# Patient Record
Sex: MALE | Race: WHITE | NOT HISPANIC OR LATINO | Employment: OTHER | URBAN - METROPOLITAN AREA
[De-identification: names, ages, dates, MRNs, and addresses within clinical notes are randomized per-mention and may not be internally consistent; named-entity substitution may affect disease eponyms.]

---

## 2017-02-20 ENCOUNTER — TELEPHONE (OUTPATIENT)
Dept: GASTROENTEROLOGY | Facility: CLINIC | Age: 72
End: 2017-02-20

## 2020-02-19 PROBLEM — I10 BENIGN ESSENTIAL HYPERTENSION: Status: ACTIVE | Noted: 2020-02-19

## 2020-02-19 PROBLEM — I63.9 CEREBROVASCULAR ACCIDENT (CVA): Status: ACTIVE | Noted: 2020-02-19

## 2020-02-19 PROBLEM — E11.9 TYPE 2 DIABETES MELLITUS, WITHOUT LONG-TERM CURRENT USE OF INSULIN: Status: ACTIVE | Noted: 2020-02-19

## 2020-02-19 PROBLEM — E78.5 HYPERLIPIDEMIA: Status: ACTIVE | Noted: 2020-02-19

## 2020-02-20 PROBLEM — I63.239 CAROTID STENOSIS, SYMPTOMATIC, WITH INFARCTION: Status: ACTIVE | Noted: 2020-02-20

## 2020-02-24 ENCOUNTER — TELEPHONE (OUTPATIENT)
Dept: VASCULAR SURGERY | Facility: CLINIC | Age: 75
End: 2020-02-24

## 2020-02-24 NOTE — TELEPHONE ENCOUNTER
----- Message from Eduarda Calvert sent at 2/24/2020  9:32 AM CST -----  Contact: patient's wife Lorin  Type: Needs Medical Advice    Who Called:  Lorin    Robert Call Back Number: [phone]  Additional Information: patient was discharged from [facility] on 02/22/2020 and was told to follow up with Dr. Mckeon 2 weeks later.  Please call to schedule this appt.

## 2020-03-10 ENCOUNTER — OFFICE VISIT (OUTPATIENT)
Dept: VASCULAR SURGERY | Facility: CLINIC | Age: 75
End: 2020-03-10
Payer: MEDICARE

## 2020-03-10 VITALS
SYSTOLIC BLOOD PRESSURE: 175 MMHG | HEIGHT: 71 IN | BODY MASS INDEX: 37.69 KG/M2 | HEART RATE: 64 BPM | DIASTOLIC BLOOD PRESSURE: 75 MMHG | WEIGHT: 269.19 LBS

## 2020-03-10 DIAGNOSIS — I63.239 CAROTID STENOSIS, SYMPTOMATIC, WITH INFARCTION: ICD-10-CM

## 2020-03-10 DIAGNOSIS — I65.21 CAROTID STENOSIS, RIGHT: Primary | ICD-10-CM

## 2020-03-10 PROCEDURE — 99999 PR PBB SHADOW E&M-EST. PATIENT-LVL III: ICD-10-PCS | Mod: PBBFAC,,, | Performed by: THORACIC SURGERY (CARDIOTHORACIC VASCULAR SURGERY)

## 2020-03-10 PROCEDURE — 99999 PR PBB SHADOW E&M-EST. PATIENT-LVL III: CPT | Mod: PBBFAC,,, | Performed by: THORACIC SURGERY (CARDIOTHORACIC VASCULAR SURGERY)

## 2020-03-10 PROCEDURE — 99024 POSTOP FOLLOW-UP VISIT: CPT | Mod: S$GLB,,, | Performed by: THORACIC SURGERY (CARDIOTHORACIC VASCULAR SURGERY)

## 2020-03-10 PROCEDURE — 99024 PR POST-OP FOLLOW-UP VISIT: ICD-10-PCS | Mod: S$GLB,,, | Performed by: THORACIC SURGERY (CARDIOTHORACIC VASCULAR SURGERY)

## 2020-03-11 NOTE — PROGRESS NOTES
"Subjective:       Jacob Noe presents to the clinic after undergoing right carotid endarterectomy with bovine pericardial patch angioplasty on 02/21/2020 at [facility] and treatment of critical right internal carotid artery stenosis associated with right hemispheric cerebrovascular accident.  He still has some slight forgetfulness, but he and his wife both feel that this is improving.  He also notices that the weakness in his left arm is nearly resolved and the vision in his right eye is markedly improved.       Objective:      BP (!) 175/75 (BP Location: Right arm, Patient Position: Sitting, BP Method: Large (Automatic))   Pulse 64   Ht 5' 11" (1.803 m)   Wt 122.1 kg (269 lb 2.9 oz)   BMI 37.54 kg/m²     Objective  General:  He appears well.  HEENT:  Good facial symmetry.  Tongue protrudes in midline.  Neck:  Right neck incision is healed.  There is no carotid bruit on either side.  Neurologic:  Alert oriented x4.  There is no obvious upper extremity weakness.  His speech is clear.  He did put down is eyeglasses just prior to walking out of the room and left them on the desk in the room (I hand them to his wife).       Assessment:      Doing well postoperatively.      Plan:      1. Continue any current medications.  2. Wound care discussed.  3. Pt is to increase activities as tolerated.  4. Follow up: 1 year with carotid ultrasound    "

## 2021-04-01 ENCOUNTER — HOSPITAL ENCOUNTER (OUTPATIENT)
Dept: RADIOLOGY | Facility: HOSPITAL | Age: 76
Discharge: HOME | End: 2021-04-01
Attending: THORACIC SURGERY (CARDIOTHORACIC VASCULAR SURGERY)
Payer: MEDICARE

## 2021-04-01 DIAGNOSIS — I65.21 CAROTID STENOSIS, RIGHT: ICD-10-CM

## 2021-04-01 PROCEDURE — 93880 EXTRACRANIAL BILAT STUDY: CPT | Mod: TC,PO

## 2021-04-01 PROCEDURE — 93880 EXTRACRANIAL BILAT STUDY: CPT | Mod: 26,,, | Performed by: RADIOLOGY

## 2021-04-01 PROCEDURE — 93880 US CAROTID BILATERAL: ICD-10-PCS | Mod: 26,,, | Performed by: RADIOLOGY

## 2021-06-04 ENCOUNTER — TELEPHONE (OUTPATIENT)
Dept: VASCULAR SURGERY | Facility: CLINIC | Age: 76
End: 2021-06-04

## 2021-07-01 ENCOUNTER — PATIENT MESSAGE (OUTPATIENT)
Dept: ADMINISTRATIVE | Facility: OTHER | Age: 76
End: 2021-07-01

## 2022-01-10 ENCOUNTER — OFFICE VISIT (OUTPATIENT)
Dept: CARDIOLOGY | Facility: CLINIC | Age: 77
End: 2022-01-10
Payer: MEDICARE

## 2022-01-10 VITALS
HEIGHT: 71 IN | OXYGEN SATURATION: 99 % | BODY MASS INDEX: 40.92 KG/M2 | SYSTOLIC BLOOD PRESSURE: 152 MMHG | HEART RATE: 74 BPM | WEIGHT: 292.31 LBS | DIASTOLIC BLOOD PRESSURE: 64 MMHG

## 2022-01-10 DIAGNOSIS — I63.239 CAROTID STENOSIS, SYMPTOMATIC, WITH INFARCTION: ICD-10-CM

## 2022-01-10 DIAGNOSIS — I10 ESSENTIAL HYPERTENSION: Primary | ICD-10-CM

## 2022-01-10 DIAGNOSIS — I63.40 CEREBROVASCULAR ACCIDENT (CVA) DUE TO EMBOLISM OF CEREBRAL ARTERY: ICD-10-CM

## 2022-01-10 DIAGNOSIS — R09.89 BRUIT: ICD-10-CM

## 2022-01-10 DIAGNOSIS — E78.2 MIXED HYPERLIPIDEMIA: ICD-10-CM

## 2022-01-10 DIAGNOSIS — Z98.890 HISTORY OF RIGHT-SIDED CAROTID ENDARTERECTOMY: ICD-10-CM

## 2022-01-10 PROCEDURE — 99999 PR PBB SHADOW E&M-EST. PATIENT-LVL III: ICD-10-PCS | Mod: PBBFAC,,, | Performed by: INTERNAL MEDICINE

## 2022-01-10 PROCEDURE — 1126F AMNT PAIN NOTED NONE PRSNT: CPT | Mod: CPTII,S$GLB,, | Performed by: INTERNAL MEDICINE

## 2022-01-10 PROCEDURE — 3288F PR FALLS RISK ASSESSMENT DOCUMENTED: ICD-10-PCS | Mod: CPTII,S$GLB,, | Performed by: INTERNAL MEDICINE

## 2022-01-10 PROCEDURE — 99214 PR OFFICE/OUTPT VISIT, EST, LEVL IV, 30-39 MIN: ICD-10-PCS | Mod: S$GLB,,, | Performed by: INTERNAL MEDICINE

## 2022-01-10 PROCEDURE — 1160F RVW MEDS BY RX/DR IN RCRD: CPT | Mod: CPTII,S$GLB,, | Performed by: INTERNAL MEDICINE

## 2022-01-10 PROCEDURE — 1101F PT FALLS ASSESS-DOCD LE1/YR: CPT | Mod: CPTII,S$GLB,, | Performed by: INTERNAL MEDICINE

## 2022-01-10 PROCEDURE — 1126F PR PAIN SEVERITY QUANTIFIED, NO PAIN PRESENT: ICD-10-PCS | Mod: CPTII,S$GLB,, | Performed by: INTERNAL MEDICINE

## 2022-01-10 PROCEDURE — 3078F DIAST BP <80 MM HG: CPT | Mod: CPTII,S$GLB,, | Performed by: INTERNAL MEDICINE

## 2022-01-10 PROCEDURE — 3078F PR MOST RECENT DIASTOLIC BLOOD PRESSURE < 80 MM HG: ICD-10-PCS | Mod: CPTII,S$GLB,, | Performed by: INTERNAL MEDICINE

## 2022-01-10 PROCEDURE — 1159F MED LIST DOCD IN RCRD: CPT | Mod: CPTII,S$GLB,, | Performed by: INTERNAL MEDICINE

## 2022-01-10 PROCEDURE — 1159F PR MEDICATION LIST DOCUMENTED IN MEDICAL RECORD: ICD-10-PCS | Mod: CPTII,S$GLB,, | Performed by: INTERNAL MEDICINE

## 2022-01-10 PROCEDURE — 93010 ELECTROCARDIOGRAM REPORT: CPT | Mod: S$GLB,,, | Performed by: INTERNAL MEDICINE

## 2022-01-10 PROCEDURE — 3008F BODY MASS INDEX DOCD: CPT | Mod: CPTII,S$GLB,, | Performed by: INTERNAL MEDICINE

## 2022-01-10 PROCEDURE — 1160F PR REVIEW ALL MEDS BY PRESCRIBER/CLIN PHARMACIST DOCUMENTED: ICD-10-PCS | Mod: CPTII,S$GLB,, | Performed by: INTERNAL MEDICINE

## 2022-01-10 PROCEDURE — 3077F SYST BP >= 140 MM HG: CPT | Mod: CPTII,S$GLB,, | Performed by: INTERNAL MEDICINE

## 2022-01-10 PROCEDURE — 3077F PR MOST RECENT SYSTOLIC BLOOD PRESSURE >= 140 MM HG: ICD-10-PCS | Mod: CPTII,S$GLB,, | Performed by: INTERNAL MEDICINE

## 2022-01-10 PROCEDURE — 99214 OFFICE O/P EST MOD 30 MIN: CPT | Mod: S$GLB,,, | Performed by: INTERNAL MEDICINE

## 2022-01-10 PROCEDURE — 1101F PR PT FALLS ASSESS DOC 0-1 FALLS W/OUT INJ PAST YR: ICD-10-PCS | Mod: CPTII,S$GLB,, | Performed by: INTERNAL MEDICINE

## 2022-01-10 PROCEDURE — 99999 PR PBB SHADOW E&M-EST. PATIENT-LVL III: CPT | Mod: PBBFAC,,, | Performed by: INTERNAL MEDICINE

## 2022-01-10 PROCEDURE — 93005 ELECTROCARDIOGRAM TRACING: CPT | Mod: PO

## 2022-01-10 PROCEDURE — 3288F FALL RISK ASSESSMENT DOCD: CPT | Mod: CPTII,S$GLB,, | Performed by: INTERNAL MEDICINE

## 2022-01-10 PROCEDURE — 93010 EKG 12-LEAD: ICD-10-PCS | Mod: S$GLB,,, | Performed by: INTERNAL MEDICINE

## 2022-01-10 PROCEDURE — 3008F PR BODY MASS INDEX (BMI) DOCUMENTED: ICD-10-PCS | Mod: CPTII,S$GLB,, | Performed by: INTERNAL MEDICINE

## 2022-01-10 NOTE — PROGRESS NOTES
Subjective:    Patient ID:  Jacob Noe is a 76 y.o. male who presents for evaluation of Establish Care      Problem List Items Addressed This Visit        Neuro    Carotid stenosis, symptomatic, with infarction    Overview     Right internal carotid artery         Cerebrovascular accident (CVA)       Cardiac/Vascular    Essential hypertension - Primary    History of right-sided carotid endarterectomy    Mixed hyperlipidemia          HPI    Here to establish care  Had history of right carotid stenosis with stroke status post right carotid endarterectomy  Had eye stroke.    The patient states that he feels OK in general.    No chest pain.  No shortness of breath.  Walks to mailbox (80 yards) every day without issues  Motivate to lose    Personal history of heart attack or stroke - Stroke as above    Past Medical History:   Diagnosis Date    Coronary artery disease     Diabetes mellitus     Hypertension     Stroke        Past Surgical History:   Procedure Laterality Date    CAROTID ENDARTERECTOMY Right 2020    Procedure: ENDARTERECTOMY-CAROTID;  Surgeon: Naeem Ortiz MD;  Location: [facility];  Service: Cardiovascular;  Laterality: Right;    CAROTID ENDARTERECTOMY Right 2020       Family History   Problem Relation Age of Onset    Cancer Brother        Social History     Socioeconomic History    Marital status:    Tobacco Use    Smoking status: Former Smoker     Types: Cigarettes     Quit date: 11/10/1997     Years since quittin.1    Smokeless tobacco: Never Used   Substance and Sexual Activity    Alcohol use: Not Currently    Drug use: Never    Sexual activity: Not Currently       Review of patient's allergies indicates:  No Known Allergies    Review of Systems   Constitutional: Negative for decreased appetite, fever and malaise/fatigue.   Eyes: Negative for blurred vision.   Cardiovascular: Negative for chest pain, dyspnea on exertion, irregular heartbeat and leg swelling.  "  Respiratory: Negative for cough, hemoptysis, shortness of breath and wheezing.    Endocrine: Negative for cold intolerance and heat intolerance.   Hematologic/Lymphatic: Negative for bleeding problem.   Musculoskeletal: Negative for muscle weakness and myalgias.   Gastrointestinal: Negative for abdominal pain, constipation and diarrhea.   Genitourinary: Negative for bladder incontinence.   Neurological: Negative for dizziness and weakness.   Psychiatric/Behavioral: Negative for depression.        Objective:     Vitals:    01/10/22 0954   BP: (!) 152/64   BP Location: Right arm   Patient Position: Sitting   BP Method: Medium (Automatic)   Pulse: 74   SpO2: 99%   Weight: 132.6 kg (292 lb 5.3 oz)   Height: 5' 11" (1.803 m)        Physical Exam  Constitutional:       Appearance: He is well-developed and well-nourished.   HENT:      Head: Normocephalic and atraumatic.   Neck:      Vascular: No JVD.   Cardiovascular:      Rate and Rhythm: Normal rate and regular rhythm.      Heart sounds: Normal heart sounds. No murmur heard.  No friction rub. No gallop.    Pulmonary:      Effort: Pulmonary effort is normal. No respiratory distress.      Breath sounds: Normal breath sounds. No wheezing or rales.   Abdominal:      General: Bowel sounds are normal.      Palpations: Abdomen is soft.      Tenderness: There is no abdominal tenderness. There is no guarding or rebound.   Musculoskeletal:         General: No edema.      Cervical back: Normal range of motion and neck supple.   Skin:     General: Skin is warm and dry.   Neurological:      Mental Status: He is alert and oriented to person, place, and time.   Psychiatric:         Behavior: Behavior normal.             Current Outpatient Medications on File Prior to Visit   Medication Sig    amLODIPine (NORVASC) 5 MG tablet Take 5 mg by mouth every evening.     APPLE CIDER VINEGAR ORAL Take 900 mg by mouth once daily.    atorvastatin (LIPITOR) 40 MG tablet Take 1 tablet (40 mg " total) by mouth once daily.    B-complex with vitamin C (Z-BEC OR EQUIV) tablet Take 1 tablet by mouth once daily.    clopidogreL (PLAVIX) 75 mg tablet Take 1 tablet (75 mg total) by mouth once daily.    cranberry fruit extract (CRANBERRY ORAL) Take 15,000 mg by mouth once daily.    hydroCHLOROthiazide (HYDRODIURIL) 25 MG tablet Take 25 mg by mouth once daily.     lisinopril (PRINIVIL,ZESTRIL) 40 MG tablet Take 40 mg by mouth every evening.     metFORMIN (GLUCOPHAGE) 1000 MG tablet Take 1,000 mg by mouth every 12 (twelve) hours.    metoprolol succinate (TOPROL-XL) 50 MG 24 hr tablet Take 50 mg by mouth 2 (two) times daily.     omega-3 fatty acids-fish oil 360-1,200 mg Cap Take 2 each by mouth once daily.    TRULICITY 1.5 mg/0.5 mL PnIj Inject 1.5 mg into the skin every Monday. Administers at 1400    aspirin (ECOTRIN) 81 MG EC tablet Take 81 mg by mouth every evening.    coenzyme Q10 100 mg capsule Take 1 capsule (100 mg total) by mouth once daily.    HYDROcodone-acetaminophen (NORCO) 5-325 mg per tablet Take 1 tablet by mouth every 6 (six) hours as needed for Pain. (Patient not taking: No sig reported)     No current facility-administered medications on file prior to visit.       Lipid Panel:   Lab Results   Component Value Date    CHOL 205 (H) 02/20/2020    HDL 38 (L) 02/20/2020    LDLCALC 133.6 02/20/2020    TRIG 167 (H) 02/20/2020    CHOLHDL 18.5 (L) 02/20/2020         The ASCVD Risk score (Na ANN MARIE Jr., et al., 2013) failed to calculate for the following reasons:    The patient has a prior MI or stroke diagnosis    All pertinent labs, imaging, and EKGs reviewed.  Patient's most recent EKG tracing was personally interpreted by this provider.    Assessment:       1. Essential hypertension    2. Mixed hyperlipidemia    3. History of right-sided carotid endarterectomy    4. Carotid stenosis, symptomatic, with infarction    5. Cerebrovascular accident (CVA) due to embolism of cerebral artery         Plan:      Symptoms OK today  BP/Pulse borderline high  Most recent echocardiogram reviewed personally     Home BP log   Continue aspirin 81 mg PO Daily  Continue atorvastatin 40 mg PO Daily  Continue amlodipine 5 mg PO Daily  Continue Plavix 75 mg PO Daily  Continue hydrochlorothiazide 25 mg PO Daily  Continue lisinopril 40 mg PO Daily  Continue metoprolol succinate 50 mg PO BID  Echocardiogram   Bilateral carotid Doppler     Continue other cardiac medications  [facility] Diet/Cardiovascular Exercise Program    Patient queried and all questions were answered.    F/u in 6 months to reassess      Signed:    Wilberto Hook MD  1/10/2022 8:05 AM

## 2022-01-20 ENCOUNTER — CLINICAL SUPPORT (OUTPATIENT)
Dept: CARDIOLOGY | Facility: HOSPITAL | Age: 77
End: 2022-01-20
Attending: INTERNAL MEDICINE
Payer: MEDICARE

## 2022-01-20 VITALS — BODY MASS INDEX: 40.88 KG/M2 | WEIGHT: 292 LBS | HEIGHT: 71 IN

## 2022-01-20 DIAGNOSIS — I63.40 CEREBROVASCULAR ACCIDENT (CVA) DUE TO EMBOLISM OF CEREBRAL ARTERY: ICD-10-CM

## 2022-01-20 DIAGNOSIS — R09.89 BRUIT: ICD-10-CM

## 2022-01-20 LAB
LEFT CBA DIAS: 14 CM/S
LEFT CBA SYS: 75 CM/S
LEFT CCA DIST DIAS: 16 CM/S
LEFT CCA DIST SYS: 79 CM/S
LEFT CCA MID DIAS: 16 CM/S
LEFT CCA MID SYS: 85 CM/S
LEFT CCA PROX DIAS: 11 CM/S
LEFT CCA PROX SYS: 84 CM/S
LEFT ECA DIAS: 13 CM/S
LEFT ECA SYS: 135 CM/S
LEFT ICA DIST DIAS: 27 CM/S
LEFT ICA DIST SYS: 123 CM/S
LEFT ICA MID DIAS: 27 CM/S
LEFT ICA MID SYS: 177 CM/S
LEFT ICA PROX DIAS: 37 CM/S
LEFT ICA PROX SYS: 226 CM/S
LEFT VERTEBRAL DIAS: 7 CM/S
LEFT VERTEBRAL SYS: 52 CM/S
OHS CV CAROTID RIGHT ICA EDV HIGHEST: 17
OHS CV CAROTID ULTRASOUND LEFT ICA/CCA RATIO: 2.86
OHS CV CAROTID ULTRASOUND RIGHT ICA/CCA RATIO: 2.02
OHS CV PV CAROTID LEFT HIGHEST CCA: 85
OHS CV PV CAROTID LEFT HIGHEST ICA: 226
OHS CV PV CAROTID RIGHT HIGHEST CCA: 100
OHS CV PV CAROTID RIGHT HIGHEST ICA: 131
OHS CV US CAROTID LEFT HIGHEST EDV: 37
RIGHT CBA DIAS: 12 CM/S
RIGHT CBA SYS: 70 CM/S
RIGHT CCA DIST DIAS: 10 CM/S
RIGHT CCA DIST SYS: 65 CM/S
RIGHT CCA MID DIAS: 10 CM/S
RIGHT CCA MID SYS: 84 CM/S
RIGHT CCA PROX DIAS: 12 CM/S
RIGHT CCA PROX SYS: 100 CM/S
RIGHT ECA DIAS: 10 CM/S
RIGHT ECA SYS: 42 CM/S
RIGHT ICA DIST DIAS: 13 CM/S
RIGHT ICA DIST SYS: 131 CM/S
RIGHT ICA MID DIAS: 13 CM/S
RIGHT ICA MID SYS: 66 CM/S
RIGHT ICA PROX DIAS: 17 CM/S
RIGHT ICA PROX SYS: 73 CM/S

## 2022-01-20 PROCEDURE — 93880 CV US DOPPLER CAROTID (CUPID ONLY): ICD-10-PCS | Mod: 26,,, | Performed by: INTERNAL MEDICINE

## 2022-01-20 PROCEDURE — 93880 EXTRACRANIAL BILAT STUDY: CPT | Mod: 26,,, | Performed by: INTERNAL MEDICINE

## 2022-01-20 PROCEDURE — 93880 EXTRACRANIAL BILAT STUDY: CPT | Mod: PO

## 2022-01-20 PROCEDURE — 93306 ECHO (CUPID ONLY): ICD-10-PCS | Mod: 26,,, | Performed by: INTERNAL MEDICINE

## 2022-01-20 PROCEDURE — 93306 TTE W/DOPPLER COMPLETE: CPT | Mod: 26,,, | Performed by: INTERNAL MEDICINE

## 2022-01-20 PROCEDURE — 93306 TTE W/DOPPLER COMPLETE: CPT | Mod: PO

## 2022-01-24 LAB
ASCENDING AORTA: 3.44 CM
AV INDEX (PROSTH): 0.58
AV MEAN GRADIENT: 6 MMHG
AV PEAK GRADIENT: 11 MMHG
AV VALVE AREA: 2.31 CM2
AV VELOCITY RATIO: 0.49
BSA FOR ECHO PROCEDURE: 2.58 M2
CV ECHO LV RWT: 0.39 CM
DOP CALC AO PEAK VEL: 1.65 M/S
DOP CALC AO VTI: 43.42 CM
DOP CALC LVOT AREA: 4 CM2
DOP CALC LVOT DIAMETER: 2.26 CM
DOP CALC LVOT PEAK VEL: 0.81 M/S
DOP CALC LVOT STROKE VOLUME: 100.48 CM3
DOP CALCLVOT PEAK VEL VTI: 25.06 CM
E WAVE DECELERATION TIME: 143.84 MSEC
E/A RATIO: 0.72
E/E' RATIO: 13.07 M/S
ECHO LV POSTERIOR WALL: 1.18 CM (ref 0.6–1.1)
EJECTION FRACTION: 65 %
FRACTIONAL SHORTENING: 29 % (ref 28–44)
INTERVENTRICULAR SEPTUM: 1.15 CM (ref 0.6–1.1)
LA MAJOR: 4.87 CM
LA MINOR: 5.14 CM
LA WIDTH: 4.36 CM
LEFT ATRIUM SIZE: 4.12 CM
LEFT ATRIUM VOLUME INDEX: 30.8 ML/M2
LEFT ATRIUM VOLUME: 76.36 CM3
LEFT INTERNAL DIMENSION IN SYSTOLE: 4.32 CM (ref 2.1–4)
LEFT VENTRICLE DIASTOLIC VOLUME INDEX: 74.6 ML/M2
LEFT VENTRICLE DIASTOLIC VOLUME: 185.02 ML
LEFT VENTRICLE MASS INDEX: 124 G/M2
LEFT VENTRICLE SYSTOLIC VOLUME INDEX: 33.9 ML/M2
LEFT VENTRICLE SYSTOLIC VOLUME: 84.07 ML
LEFT VENTRICULAR INTERNAL DIMENSION IN DIASTOLE: 6.07 CM (ref 3.5–6)
LEFT VENTRICULAR MASS: 307.65 G
LV LATERAL E/E' RATIO: 10.89 M/S
LV SEPTAL E/E' RATIO: 16.33 M/S
MV A" WAVE DURATION": 12.75 MSEC
MV PEAK A VEL: 1.37 M/S
MV PEAK E VEL: 0.98 M/S
MV STENOSIS PRESSURE HALF TIME: 41.71 MS
MV VALVE AREA P 1/2 METHOD: 5.27 CM2
PISA TR MAX VEL: 2.29 M/S
PULM VEIN S/D RATIO: 1.43
PV PEAK D VEL: 0.47 M/S
PV PEAK S VEL: 0.67 M/S
RA MAJOR: 3.25 CM
RA PRESSURE: 3 MMHG
RA WIDTH: 2.95 CM
RIGHT VENTRICULAR END-DIASTOLIC DIMENSION: 3.15 CM
RV TISSUE DOPPLER FREE WALL SYSTOLIC VELOCITY 1 (APICAL 4 CHAMBER VIEW): 14.65 CM/S
SINUS: 3.23 CM
STJ: 2.72 CM
TDI LATERAL: 0.09 M/S
TDI SEPTAL: 0.06 M/S
TDI: 0.08 M/S
TR MAX PG: 21 MMHG
TRICUSPID ANNULAR PLANE SYSTOLIC EXCURSION: 2.54 CM
TV REST PULMONARY ARTERY PRESSURE: 24 MMHG

## 2022-06-28 ENCOUNTER — TELEPHONE (OUTPATIENT)
Dept: VASCULAR SURGERY | Facility: CLINIC | Age: 77
End: 2022-06-28
Payer: MEDICARE

## 2022-06-28 NOTE — TELEPHONE ENCOUNTER
Patient saw Dr Hook and had carotid doppler done which shows some mild blockages seen in the carotid arteries, but not bad enough to warrant surgery or other intervention.  Please repeat carotid Doppler in 1 year.

## 2022-07-19 ENCOUNTER — OFFICE VISIT (OUTPATIENT)
Dept: CARDIOLOGY | Facility: CLINIC | Age: 77
End: 2022-07-19
Payer: MEDICARE

## 2022-07-19 VITALS
DIASTOLIC BLOOD PRESSURE: 64 MMHG | HEART RATE: 60 BPM | WEIGHT: 291.44 LBS | SYSTOLIC BLOOD PRESSURE: 138 MMHG | HEIGHT: 71 IN | BODY MASS INDEX: 40.8 KG/M2

## 2022-07-19 DIAGNOSIS — I77.89 OTHER SPECIFIED DISORDERS OF ARTERIES AND ARTERIOLES: ICD-10-CM

## 2022-07-19 DIAGNOSIS — Z98.890 HISTORY OF RIGHT-SIDED CAROTID ENDARTERECTOMY: ICD-10-CM

## 2022-07-19 DIAGNOSIS — E78.2 MIXED HYPERLIPIDEMIA: ICD-10-CM

## 2022-07-19 DIAGNOSIS — I10 ESSENTIAL HYPERTENSION: Primary | ICD-10-CM

## 2022-07-19 PROCEDURE — 1101F PT FALLS ASSESS-DOCD LE1/YR: CPT | Mod: CPTII,S$GLB,, | Performed by: INTERNAL MEDICINE

## 2022-07-19 PROCEDURE — 3288F PR FALLS RISK ASSESSMENT DOCUMENTED: ICD-10-PCS | Mod: CPTII,S$GLB,, | Performed by: INTERNAL MEDICINE

## 2022-07-19 PROCEDURE — 3075F PR MOST RECENT SYSTOLIC BLOOD PRESS GE 130-139MM HG: ICD-10-PCS | Mod: CPTII,S$GLB,, | Performed by: INTERNAL MEDICINE

## 2022-07-19 PROCEDURE — 3008F PR BODY MASS INDEX (BMI) DOCUMENTED: ICD-10-PCS | Mod: CPTII,S$GLB,, | Performed by: INTERNAL MEDICINE

## 2022-07-19 PROCEDURE — 3078F PR MOST RECENT DIASTOLIC BLOOD PRESSURE < 80 MM HG: ICD-10-PCS | Mod: CPTII,S$GLB,, | Performed by: INTERNAL MEDICINE

## 2022-07-19 PROCEDURE — 1160F PR REVIEW ALL MEDS BY PRESCRIBER/CLIN PHARMACIST DOCUMENTED: ICD-10-PCS | Mod: CPTII,S$GLB,, | Performed by: INTERNAL MEDICINE

## 2022-07-19 PROCEDURE — 99999 PR PBB SHADOW E&M-EST. PATIENT-LVL II: ICD-10-PCS | Mod: PBBFAC,,, | Performed by: INTERNAL MEDICINE

## 2022-07-19 PROCEDURE — 3078F DIAST BP <80 MM HG: CPT | Mod: CPTII,S$GLB,, | Performed by: INTERNAL MEDICINE

## 2022-07-19 PROCEDURE — 1101F PR PT FALLS ASSESS DOC 0-1 FALLS W/OUT INJ PAST YR: ICD-10-PCS | Mod: CPTII,S$GLB,, | Performed by: INTERNAL MEDICINE

## 2022-07-19 PROCEDURE — 99214 PR OFFICE/OUTPT VISIT, EST, LEVL IV, 30-39 MIN: ICD-10-PCS | Mod: S$GLB,,, | Performed by: INTERNAL MEDICINE

## 2022-07-19 PROCEDURE — 3288F FALL RISK ASSESSMENT DOCD: CPT | Mod: CPTII,S$GLB,, | Performed by: INTERNAL MEDICINE

## 2022-07-19 PROCEDURE — 1126F AMNT PAIN NOTED NONE PRSNT: CPT | Mod: CPTII,S$GLB,, | Performed by: INTERNAL MEDICINE

## 2022-07-19 PROCEDURE — 1159F MED LIST DOCD IN RCRD: CPT | Mod: CPTII,S$GLB,, | Performed by: INTERNAL MEDICINE

## 2022-07-19 PROCEDURE — 99214 OFFICE O/P EST MOD 30 MIN: CPT | Mod: S$GLB,,, | Performed by: INTERNAL MEDICINE

## 2022-07-19 PROCEDURE — 1126F PR PAIN SEVERITY QUANTIFIED, NO PAIN PRESENT: ICD-10-PCS | Mod: CPTII,S$GLB,, | Performed by: INTERNAL MEDICINE

## 2022-07-19 PROCEDURE — 1160F RVW MEDS BY RX/DR IN RCRD: CPT | Mod: CPTII,S$GLB,, | Performed by: INTERNAL MEDICINE

## 2022-07-19 PROCEDURE — 3008F BODY MASS INDEX DOCD: CPT | Mod: CPTII,S$GLB,, | Performed by: INTERNAL MEDICINE

## 2022-07-19 PROCEDURE — 99999 PR PBB SHADOW E&M-EST. PATIENT-LVL II: CPT | Mod: PBBFAC,,, | Performed by: INTERNAL MEDICINE

## 2022-07-19 PROCEDURE — 1159F PR MEDICATION LIST DOCUMENTED IN MEDICAL RECORD: ICD-10-PCS | Mod: CPTII,S$GLB,, | Performed by: INTERNAL MEDICINE

## 2022-07-19 PROCEDURE — 3075F SYST BP GE 130 - 139MM HG: CPT | Mod: CPTII,S$GLB,, | Performed by: INTERNAL MEDICINE

## 2022-07-19 NOTE — PROGRESS NOTES
"Subjective:    Patient ID:  Jacob Noe is a 76 y.o. male who presents for follow-up of No chief complaint on file.      Problem List Items Addressed This Visit        Cardiac/Vascular    Essential hypertension - Primary    History of right-sided carotid endarterectomy    Mixed hyperlipidemia          HPI    Patient was last seen on 01/10/2022 at which time he was doing OK from a cardiac standpoint.  Echocardiogram and bilateral carotid Doppler were ordered for evaluation.  Echocardiogram showed preserved ejection fraction without acute abnormalities.  Carotid Doppler did not show significant stenosis, but repeat was recommended in 1 year.    On assessment today, the patient states that he feels well OK.    No chest pain.  No shortness of breath.  Has not lost any weight  Walks to [employer] (80 yards) every day without issues        Objective:     Vitals:    07/19/22 1043   BP: 138/64   BP Location: Left arm   Patient Position: Sitting   BP Method: Large (Automatic)   Pulse: 60   Weight: 132.2 kg (291 lb 7.2 oz)   Height: 5' 11" (1.803 m)        Physical Exam  Constitutional:       Appearance: He is well-developed.   HENT:      Head: Normocephalic and atraumatic.   Neck:      Vascular: No JVD.   Cardiovascular:      Rate and Rhythm: Normal rate and regular rhythm.      Heart sounds: Normal heart sounds. No murmur heard.    No friction rub. No gallop.   Pulmonary:      Effort: Pulmonary effort is normal. No respiratory distress.      Breath sounds: Normal breath sounds. No wheezing or rales.   Abdominal:      General: Bowel sounds are normal.      Palpations: Abdomen is soft.      Tenderness: There is no abdominal tenderness. There is no guarding or rebound.   Musculoskeletal:      Cervical back: Normal range of motion and neck supple.   Skin:     General: Skin is warm and dry.   Neurological:      Mental Status: He is alert and oriented to person, place, and time.   Psychiatric:         Behavior: Behavior normal. "             Current Outpatient Medications on File Prior to Visit   Medication Sig    amLODIPine (NORVASC) 5 MG tablet Take 5 mg by mouth every evening.     APPLE CIDER VINEGAR ORAL Take 900 mg by mouth once daily.    B-complex with vitamin C (Z-BEC OR EQUIV) tablet Take 1 tablet by mouth once daily.    cranberry fruit extract (CRANBERRY ORAL) Take 15,000 mg by mouth once daily.    hydroCHLOROthiazide (HYDRODIURIL) 25 MG tablet Take 25 mg by mouth once daily.     HYDROcodone-acetaminophen (NORCO) 5-325 mg per tablet Take 1 tablet by mouth every 6 (six) hours as needed for Pain.    metFORMIN (GLUCOPHAGE) 1000 MG tablet Take 1,000 mg by mouth every 12 (twelve) hours.    metoprolol succinate (TOPROL-XL) 50 MG 24 hr tablet Take 50 mg by mouth 2 (two) times daily.     omega-3 fatty acids-fish oil 360-1,200 mg Cap Take 2 each by mouth once daily.    TRULICITY 1.5 mg/0.5 mL PnIj Inject 1.5 mg into the skin every Monday. Administers at 1400    aspirin (ECOTRIN) 81 MG EC tablet Take 81 mg by mouth every evening.    atorvastatin (LIPITOR) 40 MG tablet Take 1 tablet (40 mg total) by mouth once daily.    clopidogreL (PLAVIX) 75 mg tablet Take 1 tablet (75 mg total) by mouth once daily.    coenzyme Q10 100 mg capsule Take 1 capsule (100 mg total) by mouth once daily.    lisinopril (PRINIVIL,ZESTRIL) 40 MG tablet Take 40 mg by mouth every evening.      No current facility-administered medications on file prior to visit.       Lipid Panel:   Lab Results   Component Value Date    CHOL 205 (H) 02/20/2020    HDL 38 (L) 02/20/2020    LDLCALC 133.6 02/20/2020    TRIG 167 (H) 02/20/2020    CHOLHDL 18.5 (L) 02/20/2020       The ASCVD Risk score (Na ANN MARIE Jr., et al., 2013) failed to calculate for the following reasons:    The patient has a prior MI or stroke diagnosis    All pertinent labs, imaging, and EKGs reviewed.  Patient's most recent EKG tracing was personally interpreted by this provider.    Assessment:       1.  Essential hypertension    2. Mixed hyperlipidemia    3. History of right-sided carotid endarterectomy         Plan:     Symptoms OK today  BP/Pulse OK todayh  Most recent echocardiogram reviewed personally     Repeat carotid Doppler prior to next visit   Continue aspirin 81 mg PO Daily  Continue atorvastatin 40 mg PO Daily  Continue amlodipine 5 mg PO Daily  Continue Plavix 75 mg PO Daily  Continue hydrochlorothiazide 25 mg PO Daily  Continue lisinopril 40 mg PO Daily  Continue metoprolol succinate 50 mg PO BID    Continue other cardiac medications  Mediterranean Diet/Cardiovascular Exercise Program    Patient queried and all questions were answered.    F/u in 6-9 months to reassess      Signed:    Wilberto Hook MD  7/19/2022 9:44 PM

## 2022-08-11 ENCOUNTER — CLINICAL SUPPORT (OUTPATIENT)
Dept: CARDIOLOGY | Facility: HOSPITAL | Age: 77
End: 2022-08-11
Attending: INTERNAL MEDICINE
Payer: MEDICARE

## 2022-08-11 DIAGNOSIS — Z98.890 HISTORY OF RIGHT-SIDED CAROTID ENDARTERECTOMY: ICD-10-CM

## 2022-08-11 DIAGNOSIS — I77.89 OTHER SPECIFIED DISORDERS OF ARTERIES AND ARTERIOLES: ICD-10-CM

## 2022-08-11 LAB
LEFT ARM DIASTOLIC BLOOD PRESSURE: 64 MMHG
LEFT ARM SYSTOLIC BLOOD PRESSURE: 138 MMHG
LEFT CBA DIAS: 8 CM/S
LEFT CBA SYS: 74 CM/S
LEFT CCA DIST DIAS: 10 CM/S
LEFT CCA DIST SYS: 74 CM/S
LEFT CCA MID DIAS: 10 CM/S
LEFT CCA MID SYS: 94 CM/S
LEFT CCA PROX DIAS: 12 CM/S
LEFT CCA PROX SYS: 90 CM/S
LEFT ECA DIAS: 13 CM/S
LEFT ECA SYS: 177 CM/S
LEFT ICA DIST DIAS: 23 CM/S
LEFT ICA DIST SYS: 182 CM/S
LEFT ICA MID DIAS: 25 CM/S
LEFT ICA MID SYS: 196 CM/S
LEFT ICA PROX DIAS: 12 CM/S
LEFT ICA PROX SYS: 79 CM/S
LEFT VERTEBRAL DIAS: 11 CM/S
LEFT VERTEBRAL SYS: 69 CM/S
OHS CV CAROTID RIGHT ICA EDV HIGHEST: 18
OHS CV CAROTID ULTRASOUND LEFT ICA/CCA RATIO: 2.65
OHS CV CAROTID ULTRASOUND RIGHT ICA/CCA RATIO: 0.98
OHS CV PV CAROTID LEFT HIGHEST CCA: 94
OHS CV PV CAROTID LEFT HIGHEST ICA: 196
OHS CV PV CAROTID RIGHT HIGHEST CCA: 114
OHS CV PV CAROTID RIGHT HIGHEST ICA: 96
OHS CV US CAROTID LEFT HIGHEST EDV: 25
RIGHT ARM DIASTOLIC BLOOD PRESSURE: 64 MMHG
RIGHT ARM SYSTOLIC BLOOD PRESSURE: 138 MMHG
RIGHT CBA DIAS: 6 CM/S
RIGHT CBA SYS: 67 CM/S
RIGHT CCA DIST DIAS: 10 CM/S
RIGHT CCA DIST SYS: 98 CM/S
RIGHT CCA MID DIAS: 12 CM/S
RIGHT CCA MID SYS: 114 CM/S
RIGHT CCA PROX DIAS: 10 CM/S
RIGHT CCA PROX SYS: 105 CM/S
RIGHT ECA DIAS: 13 CM/S
RIGHT ECA SYS: 146 CM/S
RIGHT ICA DIST DIAS: 18 CM/S
RIGHT ICA DIST SYS: 96 CM/S
RIGHT ICA MID DIAS: 12 CM/S
RIGHT ICA MID SYS: 84 CM/S
RIGHT ICA PROX DIAS: 10 CM/S
RIGHT ICA PROX SYS: 72 CM/S
RIGHT VERTEBRAL DIAS: 10 CM/S
RIGHT VERTEBRAL SYS: 55 CM/S

## 2022-08-11 PROCEDURE — 93880 CV US DOPPLER CAROTID (CUPID ONLY): ICD-10-PCS | Mod: 26,,, | Performed by: INTERNAL MEDICINE

## 2022-08-11 PROCEDURE — 93880 EXTRACRANIAL BILAT STUDY: CPT | Mod: 26,,, | Performed by: INTERNAL MEDICINE

## 2022-08-11 PROCEDURE — 93880 EXTRACRANIAL BILAT STUDY: CPT | Mod: PO

## 2023-04-17 NOTE — PROGRESS NOTES
"Subjective:    Patient ID:  Jacob Noe is a 77 y.o. male who presents for follow-up of Hypertension and Hyperlipidemia      Problem List Items Addressed This Visit          Cardiac/Vascular    Essential hypertension - Primary    History of right-sided carotid endarterectomy    Mixed hyperlipidemia       HPI    Patient was last seen on 07/19/2022 at which time he was doing okay from a cardiac standpoint.  Carotid Doppler showed 40-49% left carotid stenosis in August 2022.    On assessment today, the patient states that he feels OK.    No chest pain.  No shortness of breath.  Walking to mailbox every day without issues    Home BP - 150s systolic       Objective:     Vitals:    04/18/23 1417 04/18/23 1421   BP: (!) 170/71 (!) 169/76   BP Location: Right arm Left arm   Patient Position: Sitting Sitting   BP Method: Large (Automatic) Large (Automatic)   Pulse: 63 60   Weight: 132.2 kg (291 lb 7.2 oz)    Height: 5' 11" (1.803 m)         Physical Exam  Constitutional:       Appearance: He is well-developed.   HENT:      Head: Normocephalic and atraumatic.   Neck:      Vascular: No JVD.   Cardiovascular:      Rate and Rhythm: Normal rate and regular rhythm.      Heart sounds: Normal heart sounds. No murmur heard.    No friction rub. No gallop.   Pulmonary:      Effort: Pulmonary effort is normal. No respiratory distress.      Breath sounds: Normal breath sounds. No wheezing or rales.   Abdominal:      General: Bowel sounds are normal.      Palpations: Abdomen is soft.      Tenderness: There is no abdominal tenderness. There is no guarding or rebound.   Musculoskeletal:      Cervical back: Normal range of motion and neck supple.   Skin:     General: Skin is warm and dry.   Neurological:      Mental Status: He is alert and oriented to person, place, and time.   Psychiatric:         Behavior: Behavior normal.           Current Outpatient Medications   Medication Instructions    amLODIPine (NORVASC) 5 mg, Oral, Nightly    " APPLE CIDER VINEGAR ORAL 900 mg, Oral, Daily    aspirin (ECOTRIN) 81 mg, Oral, Nightly    atorvastatin (LIPITOR) 40 mg, Oral, Daily    B-complex with vitamin C (Z-BEC OR EQUIV) tablet 1 tablet, Oral, Daily    clopidogreL (PLAVIX) 75 mg, Oral, Daily    coenzyme Q10 100 mg, Oral, Daily    cranberry fruit extract (CRANBERRY ORAL) 15,000 mg, Oral, Daily    hydroCHLOROthiazide (HYDRODIURIL) 25 mg, Oral, Daily    HYDROcodone-acetaminophen (NORCO) 5-325 mg per tablet 1 tablet, Oral, Every 6 hours PRN    lisinopriL (PRINIVIL,ZESTRIL) 40 mg, Oral, Nightly    metFORMIN (GLUCOPHAGE) 1,000 mg, Oral, Every 12 hours    metoprolol succinate (TOPROL-XL) 50 mg, Oral, 2 times daily    omega-3 fatty acids-fish oil 360-1,200 mg Cap 2 each, Oral, Daily    TRULICITY 1.5 mg, Subcutaneous, Every Monday, Administers at 1400       Lipid Panel:   Lab Results   Component Value Date    CHOL 205 (H) 02/20/2020    HDL 38 (L) 02/20/2020    LDLCALC 133.6 02/20/2020    TRIG 167 (H) 02/20/2020    CHOLHDL 18.5 (L) 02/20/2020       The ASCVD Risk score (Aubree DK, et al., 2019) failed to calculate for the following reasons:    The patient has a prior MI or stroke diagnosis    All pertinent labs, imaging, and EKGs reviewed.  Patient's most recent EKG tracing was personally interpreted by this provider.    Most Recent EKG Results  Results for orders placed or performed in visit on 01/10/22   IN OFFICE EKG 12-LEAD (to [place])    Collection Time: 01/10/22 10:55 AM    Narrative    Test Reason : I10,    Vent. Rate : 072 BPM     Atrial Rate : 072 BPM     P-R Int : 148 ms          QRS Dur : 122 ms      QT Int : 414 ms       P-R-T Axes : 042 -32 006 degrees     QTc Int : 453 ms    Normal sinus rhythm  Left axis deviation  Nonspecific ST abnormality  Abnormal ECG  When compared with ECG of 21-FEB-2020 15:05,  T wave inversion no longer evident in Anterior-lateral leads  Confirmed by KEVIN LEON MD ([id number]) on 1/11/2022 10:56:12 AM    Referred By: REJI WATTS            Confirmed By:KEVIN LEON MD       Most Recent Echocardiogram Results  Results for orders placed in visit on 01/20/22    Echo    Interpretation Summary  · The left ventricle is normal in size with eccentric hypertrophy and normal systolic function.  · The estimated ejection fraction is 60-65%.  · Normal left ventricular diastolic function.  · Normal right ventricular size with normal right ventricular systolic function.  · Normal central venous pressure (3 mmHg).  · The estimated PA systolic pressure is 24 mmHg.      Most Recent Nuclear Stress Test Results  No results found for this or any previous visit.      Most Recent Cardiac PET Stress Test Results  No results found for this or any previous visit.      Most Recent Cardiovascular Angiogram results  No results found for this or any previous visit.      Other Most Recent Cardiology Results  Results for orders placed in visit on 08/11/22    CV Ultrasound Bilateral Doppler Carotid    Narrative  · There is 40-49% left Internal Carotid Stenosis.  · Mild heterogeneous plaque on the left side.  · Normal antegrade vertebral flow bilaterally        Assessment:       1. Essential hypertension    2. Mixed hyperlipidemia    3. History of right-sided carotid endarterectomy         Plan:     Symptoms OK today  BP elevated today  Most recent echocardiogram reviewed personally     Continue aspirin 81 mg PO Daily  Continue amlodipine 5 mg PO Daily  Continue Plavix 75 mg PO Daily   Continue coenzyme Q10   Continue hydrochlorothiazide 25 mg PO Daily  Continue lisinopril 40 mg PO Daily  Continue metoprolol succinate 50 mg PO BID  Continue Omega 3s  Lipid panel before next visit   Gave options of increasing antihypertensives vs weight loss, patient chose weight losss]    Greater than 8 minutes were spent in counseling/evaluation of appropriate diet and exercise and/or discussion of available laboratory/diagnostic results.    Continue other cardiac medications  Mediterranean  Diet/Cardiovascular Exercise Program    Patient queried and all questions were answered.    F/u in 6-9 months to reassess      Signed:    Wilberto Hook MD  4/18/2023 1:02 PM

## 2023-04-18 ENCOUNTER — OFFICE VISIT (OUTPATIENT)
Dept: CARDIOLOGY | Facility: CLINIC | Age: 78
End: 2023-04-18
Payer: MEDICARE

## 2023-04-18 VITALS
HEART RATE: 60 BPM | SYSTOLIC BLOOD PRESSURE: 169 MMHG | WEIGHT: 291.44 LBS | DIASTOLIC BLOOD PRESSURE: 76 MMHG | HEIGHT: 71 IN | BODY MASS INDEX: 40.8 KG/M2

## 2023-04-18 DIAGNOSIS — I10 ESSENTIAL HYPERTENSION: Primary | ICD-10-CM

## 2023-04-18 DIAGNOSIS — Z98.890 HISTORY OF RIGHT-SIDED CAROTID ENDARTERECTOMY: ICD-10-CM

## 2023-04-18 DIAGNOSIS — E78.2 MIXED HYPERLIPIDEMIA: ICD-10-CM

## 2023-04-18 PROCEDURE — 1160F PR REVIEW ALL MEDS BY PRESCRIBER/CLIN PHARMACIST DOCUMENTED: ICD-10-PCS | Mod: CPTII,S$GLB,, | Performed by: INTERNAL MEDICINE

## 2023-04-18 PROCEDURE — 1101F PR PT FALLS ASSESS DOC 0-1 FALLS W/OUT INJ PAST YR: ICD-10-PCS | Mod: CPTII,S$GLB,, | Performed by: INTERNAL MEDICINE

## 2023-04-18 PROCEDURE — 99999 PR PBB SHADOW E&M-EST. PATIENT-LVL IV: ICD-10-PCS | Mod: PBBFAC,,, | Performed by: INTERNAL MEDICINE

## 2023-04-18 PROCEDURE — 99214 OFFICE O/P EST MOD 30 MIN: CPT | Mod: S$GLB,,, | Performed by: INTERNAL MEDICINE

## 2023-04-18 PROCEDURE — 3078F PR MOST RECENT DIASTOLIC BLOOD PRESSURE < 80 MM HG: ICD-10-PCS | Mod: CPTII,S$GLB,, | Performed by: INTERNAL MEDICINE

## 2023-04-18 PROCEDURE — 99999 PR PBB SHADOW E&M-EST. PATIENT-LVL IV: CPT | Mod: PBBFAC,,, | Performed by: INTERNAL MEDICINE

## 2023-04-18 PROCEDURE — 3077F SYST BP >= 140 MM HG: CPT | Mod: CPTII,S$GLB,, | Performed by: INTERNAL MEDICINE

## 2023-04-18 PROCEDURE — 3288F FALL RISK ASSESSMENT DOCD: CPT | Mod: CPTII,S$GLB,, | Performed by: INTERNAL MEDICINE

## 2023-04-18 PROCEDURE — 1159F PR MEDICATION LIST DOCUMENTED IN MEDICAL RECORD: ICD-10-PCS | Mod: CPTII,S$GLB,, | Performed by: INTERNAL MEDICINE

## 2023-04-18 PROCEDURE — 1159F MED LIST DOCD IN RCRD: CPT | Mod: CPTII,S$GLB,, | Performed by: INTERNAL MEDICINE

## 2023-04-18 PROCEDURE — 3078F DIAST BP <80 MM HG: CPT | Mod: CPTII,S$GLB,, | Performed by: INTERNAL MEDICINE

## 2023-04-18 PROCEDURE — 1160F RVW MEDS BY RX/DR IN RCRD: CPT | Mod: CPTII,S$GLB,, | Performed by: INTERNAL MEDICINE

## 2023-04-18 PROCEDURE — 1126F PR PAIN SEVERITY QUANTIFIED, NO PAIN PRESENT: ICD-10-PCS | Mod: CPTII,S$GLB,, | Performed by: INTERNAL MEDICINE

## 2023-04-18 PROCEDURE — 3288F PR FALLS RISK ASSESSMENT DOCUMENTED: ICD-10-PCS | Mod: CPTII,S$GLB,, | Performed by: INTERNAL MEDICINE

## 2023-04-18 PROCEDURE — 1101F PT FALLS ASSESS-DOCD LE1/YR: CPT | Mod: CPTII,S$GLB,, | Performed by: INTERNAL MEDICINE

## 2023-04-18 PROCEDURE — 99214 PR OFFICE/OUTPT VISIT, EST, LEVL IV, 30-39 MIN: ICD-10-PCS | Mod: S$GLB,,, | Performed by: INTERNAL MEDICINE

## 2023-04-18 PROCEDURE — 3077F PR MOST RECENT SYSTOLIC BLOOD PRESSURE >= 140 MM HG: ICD-10-PCS | Mod: CPTII,S$GLB,, | Performed by: INTERNAL MEDICINE

## 2023-04-18 PROCEDURE — 1126F AMNT PAIN NOTED NONE PRSNT: CPT | Mod: CPTII,S$GLB,, | Performed by: INTERNAL MEDICINE

## 2024-02-04 PROBLEM — I77.89 OTHER SPECIFIED DISORDERS OF ARTERIES AND ARTERIOLES: Status: ACTIVE | Noted: 2024-02-04

## 2024-02-04 NOTE — PROGRESS NOTES
"Subjective:    Patient ID:  Jacob Noe is a 78 y.o. male who presents for follow-up of Hypertension      Problem List Items Addressed This Visit          Cardiac/Vascular    Essential hypertension - Primary    History of right-sided carotid endarterectomy    Mixed hyperlipidemia    Other specified disorders of arteries and arterioles       HPI    Patient was last seen on 04/18/2023 at which time lifestyle modification was discussed..    On assessment today, the patient states that he feels OK.    No chest pain.  No shortness of breath.  Still walking to mailbox daily without issues     Home BP - 140s per his report       Objective:     Vitals:    02/05/24 1147   BP: (!) 170/66   BP Location: Left arm   Patient Position: Sitting   BP Method: Large (Automatic)   Pulse: (!) 59   Weight: 125.8 kg (277 lb 5.4 oz)   Height: 5' 11" (1.803 m)       BP Readings from Last 5 Encounters:   02/05/24 (!) 170/66   04/18/23 (!) 169/76   07/19/22 138/64   01/10/22 (!) 152/64   03/10/20 (!) 175/75        Physical Exam  Constitutional:       Appearance: He is well-developed.   HENT:      Head: Normocephalic and atraumatic.   Neck:      Vascular: No JVD.   Cardiovascular:      Rate and Rhythm: Normal rate and regular rhythm.      Heart sounds: Normal heart sounds. No murmur heard.     No friction rub. No gallop.   Pulmonary:      Effort: Pulmonary effort is normal. No respiratory distress.      Breath sounds: Normal breath sounds. No wheezing or rales.   Abdominal:      General: Bowel sounds are normal.      Palpations: Abdomen is soft.      Tenderness: There is no abdominal tenderness. There is no guarding or rebound.   Musculoskeletal:      Cervical back: Normal range of motion and neck supple.   Skin:     General: Skin is warm and dry.   Neurological:      Mental Status: He is alert and oriented to person, place, and time.   Psychiatric:         Behavior: Behavior normal.             Current Outpatient Medications   Medication " Instructions    amLODIPine (NORVASC) 5 mg, Oral, Nightly    APPLE CIDER VINEGAR ORAL 900 mg, Oral, Daily    aspirin (ECOTRIN) 81 mg, Oral, Nightly    atorvastatin (LIPITOR) 40 mg, Oral, Daily    B-complex with vitamin C (Z-BEC OR EQUIV) tablet 1 tablet, Oral, Daily    clopidogreL (PLAVIX) 75 mg, Oral, Daily    coenzyme Q10 100 mg, Oral, Daily    cranberry fruit extract (CRANBERRY ORAL) 15,000 mg, Oral, Daily    hydroCHLOROthiazide (HYDRODIURIL) 25 mg, Oral, Daily    HYDROcodone-acetaminophen (NORCO) 5-325 mg per tablet 1 tablet, Oral, Every 6 hours PRN    lisinopriL (PRINIVIL,ZESTRIL) 40 mg, Oral, Nightly    metFORMIN (GLUCOPHAGE) 1,000 mg, Oral, Every 12 hours    metoprolol succinate (TOPROL-XL) 50 mg, Oral, 2 times daily    omega-3 fatty acids-fish oil 360-1,200 mg Cap 2 each, Oral, Daily    TRULICITY 1.5 mg, Subcutaneous, Every Monday, Administers at 1400       Lipid Panel:   Lab Results   Component Value Date    CHOL 205 (H) 02/20/2020    HDL 38 (L) 02/20/2020    LDLCALC 133.6 02/20/2020    TRIG 167 (H) 02/20/2020    CHOLHDL 18.5 (L) 02/20/2020       The ASCVD Risk score (Aubree DK, et al., 2019) failed to calculate for the following reasons:    The patient has a prior MI or stroke diagnosis    Most Recent EKG Results  Results for orders placed or performed in visit on 01/10/22   IN OFFICE EKG 12-LEAD (to [place])    Collection Time: 01/10/22 10:55 AM    Narrative    Test Reason : I10,    Vent. Rate : 072 BPM     Atrial Rate : 072 BPM     P-R Int : 148 ms          QRS Dur : 122 ms      QT Int : 414 ms       P-R-T Axes : 042 -32 006 degrees     QTc Int : 453 ms    Normal sinus rhythm  Left axis deviation  Nonspecific ST abnormality  Abnormal ECG  When compared with ECG of 21-FEB-2020 15:05,  T wave inversion no longer evident in Anterior-lateral leads  Confirmed by KEVIN LEON MD ([id number]) on 1/11/2022 10:56:12 AM    Referred By: REJI WATTS           Confirmed By:KEVIN LEON MD       Most Recent Echocardiogram  Results  Results for orders placed in visit on 01/20/22    Echo    Interpretation Summary  · The left ventricle is normal in size with eccentric hypertrophy and normal systolic function.  · The estimated ejection fraction is 60-65%.  · Normal left ventricular diastolic function.  · Normal right ventricular size with normal right ventricular systolic function.  · Normal central venous pressure (3 mmHg).  · The estimated PA systolic pressure is 24 mmHg.      Most Recent Nuclear Stress Test Results  No results found for this or any previous visit.      Most Recent Cardiac PET Stress Test Results  No results found for this or any previous visit.      Most Recent Cardiovascular Angiogram results  No results found for this or any previous visit.      Other Most Recent Cardiology Results  Results for orders placed in visit on 08/11/22    CV Ultrasound Bilateral Doppler Carotid    Narrative  · There is 40-49% left Internal Carotid Stenosis.  · Mild heterogeneous plaque on the left side.  · Normal antegrade vertebral flow bilaterally        All pertinent data including labs, imaging, EKGs, and studies listed above were reviewed.  Patient's most recent EKG tracing was personally interpreted by this provider.    Assessment:       1. Essential hypertension    2. Mixed hyperlipidemia    3. History of right-sided carotid endarterectomy    4. Other specified disorders of arteries and arterioles         Plan for treatment of the above diagnoses:     Symptoms OK today  BP elevated today   Most recent echocardiogram reviewed personally     Carotid Doppler   Continue amlodipine 5 mg PO Daily   Continue weight loss   Continue aspirin 81 mg PO Daily  Continue atorvastatin 40 mg PO Daily  Continue Plavix 75 mg PO Daily   Continue coenzyme Q10   Continue hydrochlorothiazide 25 mg PO Daily   Continue lisinopril 40 mg PO Daily  Continue metoprolol succinate 50 mg PO BID  Call in 3 months with BP number    Continue other cardiac  medications  Mediterranean Diet/Cardiovascular Exercise Program    Patient queried and all questions were answered.    F/u in 9-12 months      Signed:    Wilberto Hook MD  2/5/2024 12:51 PM

## 2024-02-05 ENCOUNTER — OFFICE VISIT (OUTPATIENT)
Dept: CARDIOLOGY | Facility: CLINIC | Age: 79
End: 2024-02-05
Payer: MEDICARE

## 2024-02-05 VITALS
SYSTOLIC BLOOD PRESSURE: 152 MMHG | HEART RATE: 59 BPM | HEIGHT: 71 IN | BODY MASS INDEX: 38.82 KG/M2 | WEIGHT: 277.31 LBS | DIASTOLIC BLOOD PRESSURE: 76 MMHG

## 2024-02-05 DIAGNOSIS — R09.89 BRUIT: ICD-10-CM

## 2024-02-05 DIAGNOSIS — I10 ESSENTIAL HYPERTENSION: Primary | ICD-10-CM

## 2024-02-05 DIAGNOSIS — E78.2 MIXED HYPERLIPIDEMIA: ICD-10-CM

## 2024-02-05 DIAGNOSIS — Z98.890 HISTORY OF RIGHT-SIDED CAROTID ENDARTERECTOMY: ICD-10-CM

## 2024-02-05 DIAGNOSIS — I77.89 OTHER SPECIFIED DISORDERS OF ARTERIES AND ARTERIOLES: ICD-10-CM

## 2024-02-05 PROCEDURE — 3078F DIAST BP <80 MM HG: CPT | Mod: CPTII,S$GLB,, | Performed by: INTERNAL MEDICINE

## 2024-02-05 PROCEDURE — 99999 PR PBB SHADOW E&M-EST. PATIENT-LVL III: CPT | Mod: PBBFAC,,, | Performed by: INTERNAL MEDICINE

## 2024-02-05 PROCEDURE — 1126F AMNT PAIN NOTED NONE PRSNT: CPT | Mod: CPTII,S$GLB,, | Performed by: INTERNAL MEDICINE

## 2024-02-05 PROCEDURE — 1160F RVW MEDS BY RX/DR IN RCRD: CPT | Mod: CPTII,S$GLB,, | Performed by: INTERNAL MEDICINE

## 2024-02-05 PROCEDURE — 1101F PT FALLS ASSESS-DOCD LE1/YR: CPT | Mod: CPTII,S$GLB,, | Performed by: INTERNAL MEDICINE

## 2024-02-05 PROCEDURE — 1159F MED LIST DOCD IN RCRD: CPT | Mod: CPTII,S$GLB,, | Performed by: INTERNAL MEDICINE

## 2024-02-05 PROCEDURE — 99214 OFFICE O/P EST MOD 30 MIN: CPT | Mod: S$GLB,,, | Performed by: INTERNAL MEDICINE

## 2024-02-05 PROCEDURE — 3288F FALL RISK ASSESSMENT DOCD: CPT | Mod: CPTII,S$GLB,, | Performed by: INTERNAL MEDICINE

## 2024-02-05 PROCEDURE — 3077F SYST BP >= 140 MM HG: CPT | Mod: CPTII,S$GLB,, | Performed by: INTERNAL MEDICINE

## 2024-02-15 ENCOUNTER — HOSPITAL ENCOUNTER (OUTPATIENT)
Dept: CARDIOLOGY | Facility: HOSPITAL | Age: 79
Discharge: HOME | End: 2024-02-15
Attending: INTERNAL MEDICINE
Payer: MEDICARE

## 2024-02-15 DIAGNOSIS — R09.89 BRUIT: ICD-10-CM

## 2024-02-15 DIAGNOSIS — Z98.890 HISTORY OF RIGHT-SIDED CAROTID ENDARTERECTOMY: ICD-10-CM

## 2024-02-15 LAB
LEFT CBA DIAS: 12 CM/S
LEFT CBA SYS: 64 CM/S
LEFT CCA DIST DIAS: 11 CM/S
LEFT CCA DIST SYS: 72 CM/S
LEFT CCA MID DIAS: 10 CM/S
LEFT CCA MID SYS: 97 CM/S
LEFT CCA PROX DIAS: 6 CM/S
LEFT CCA PROX SYS: 91 CM/S
LEFT ECA DIAS: 18 CM/S
LEFT ECA SYS: 155 CM/S
LEFT ICA DIST DIAS: 21 CM/S
LEFT ICA DIST SYS: 122 CM/S
LEFT ICA MID DIAS: 21 CM/S
LEFT ICA MID SYS: 171 CM/S
LEFT ICA PROX DIAS: 32 CM/S
LEFT ICA PROX SYS: 220 CM/S
LEFT VERTEBRAL DIAS: 9 CM/S
LEFT VERTEBRAL SYS: 56 CM/S
OHS CV CAROTID RIGHT ICA EDV HIGHEST: 19
OHS CV CAROTID ULTRASOUND LEFT ICA/CCA RATIO: 3.06
OHS CV CAROTID ULTRASOUND RIGHT ICA/CCA RATIO: 1.21
OHS CV PV CAROTID LEFT HIGHEST CCA: 97
OHS CV PV CAROTID LEFT HIGHEST ICA: 220
OHS CV PV CAROTID RIGHT HIGHEST CCA: 120
OHS CV PV CAROTID RIGHT HIGHEST ICA: 104
OHS CV US CAROTID LEFT HIGHEST EDV: 32
RIGHT CBA DIAS: 9 CM/S
RIGHT CBA SYS: 72 CM/S
RIGHT CCA DIST DIAS: 10 CM/S
RIGHT CCA DIST SYS: 86 CM/S
RIGHT CCA MID DIAS: 10 CM/S
RIGHT CCA MID SYS: 101 CM/S
RIGHT CCA PROX DIAS: 6 CM/S
RIGHT CCA PROX SYS: 120 CM/S
RIGHT ECA DIAS: 9 CM/S
RIGHT ECA SYS: 113 CM/S
RIGHT ICA DIST DIAS: 16 CM/S
RIGHT ICA DIST SYS: 80 CM/S
RIGHT ICA MID DIAS: 19 CM/S
RIGHT ICA MID SYS: 104 CM/S
RIGHT ICA PROX DIAS: 12 CM/S
RIGHT ICA PROX SYS: 75 CM/S
RIGHT VERTEBRAL DIAS: 7 CM/S
RIGHT VERTEBRAL SYS: 36 CM/S

## 2024-02-15 PROCEDURE — 93880 EXTRACRANIAL BILAT STUDY: CPT | Mod: PO

## 2024-02-15 PROCEDURE — 93880 EXTRACRANIAL BILAT STUDY: CPT | Mod: 26,,, | Performed by: INTERNAL MEDICINE

## 2025-01-30 PROBLEM — E66.01 SEVERE OBESITY (BMI 35.0-39.9) WITH COMORBIDITY: Status: ACTIVE | Noted: 2025-01-30

## 2025-01-30 NOTE — PROGRESS NOTES
"Subjective:    Patient ID:  Jacob Noe is a 79 y.o. male who presents for follow-up of No chief complaint on file.      Problem List Items Addressed This Visit          Cardiac/Vascular    Essential hypertension    Relevant Orders    Hypertension Digital Medicine (HDMP) Enrollment Order (Completed)    History of right-sided carotid endarterectomy    Mixed hyperlipidemia    Relevant Orders    Lipid Panel    Other specified disorders of arteries and arterioles       Endocrine    Severe obesity (BMI 35.0-39.9) with comorbidity - Primary     Other Visit Diagnoses       Bruit        Relevant Orders    CV Ultrasound Bilateral Doppler Carotid              Patient was last seen on 02/05/2024 at which time carotid Doppler was ordered which showed 50-59% left internal carotid stenosis.    History of Present Illness    CHIEF COMPLAINT:  Mr. Noe presents today for follow up of hypertension.    HYPERTENSION:  He reports elevated blood pressure readings at home, with one reading exceeding 200. He denies symptoms including fainting spells, chest pain, and shortness of breath. He continues amlodipine 5 mg daily and has not previously taken a higher dose.    WEIGHT MANAGEMENT:  He reports a 7-pound weight loss attributed to reduced food intake and maintaining an active lifestyle with regular walking.    Parts of this note were transcribed using voice recognition and generative artificial intelligence software ([employer]).  Please excuse any grammatical or syntax errors and reach out to me with any questions or clarifications needed.         Home BP - Running high    Objective:     Vitals:    02/03/25 1204   BP: (!) 168/78   BP Location: Left arm   Patient Position: Sitting   Pulse: 60   Weight: 122.9 kg (270 lb 15.1 oz)   Height: 5' 11" (1.803 m)       BP Readings from Last 5 Encounters:   02/03/25 (!) 168/78   02/05/24 (!) 152/76   04/18/23 (!) 169/76   07/19/22 138/64   01/10/22 (!) 152/64        Physical " Exam  Constitutional:       Appearance: He is well-developed.   HENT:      Head: Normocephalic and atraumatic.   Neck:      Vascular: No JVD.   Cardiovascular:      Rate and Rhythm: Normal rate and regular rhythm.      Heart sounds: Normal heart sounds. No murmur heard.     No friction rub. No gallop.   Pulmonary:      Effort: Pulmonary effort is normal. No respiratory distress.      Breath sounds: Normal breath sounds. No wheezing or rales.   Abdominal:      General: Bowel sounds are normal.      Palpations: Abdomen is soft.      Tenderness: There is no abdominal tenderness. There is no guarding or rebound.   Musculoskeletal:      Cervical back: Normal range of motion and neck supple.   Skin:     General: Skin is warm and dry.   Neurological:      Mental Status: He is alert and oriented to person, place, and time.   Psychiatric:         Behavior: Behavior normal.             Current Outpatient Medications   Medication Instructions    amLODIPine (NORVASC) 10 mg, Oral, Nightly    APPLE CIDER VINEGAR ORAL 900 mg, Daily    aspirin (ECOTRIN) 81 mg, Nightly    atorvastatin (LIPITOR) 40 mg, Oral, Daily    B-complex with vitamin C (Z-BEC OR EQUIV) tablet 1 tablet, Daily    clopidogreL (PLAVIX) 75 mg, Oral, Daily    coenzyme Q10 100 mg, Oral, Daily    cranberry fruit extract (CRANBERRY ORAL) 15,000 mg, Daily    hydroCHLOROthiazide (HYDRODIURIL) 25 mg, Daily    HYDROcodone-acetaminophen (NORCO) 5-325 mg per tablet 1 tablet, Oral, Every 6 hours PRN    lisinopriL (PRINIVIL,ZESTRIL) 40 mg, Nightly    metFORMIN (GLUCOPHAGE) 1,000 mg, Every 12 hours    metoprolol succinate (TOPROL-XL) 50 mg, 2 times daily    omega-3 fatty acids-fish oil 360-1,200 mg Cap 2 each, Daily    TRULICITY 1.5 mg, Every Monday       Lipid Panel:   Lab Results   Component Value Date    CHOL 205 (H) 02/20/2020    HDL 38 (L) 02/20/2020    LDLCALC 133.6 02/20/2020    TRIG 167 (H) 02/20/2020    CHOLHDL 18.5 (L) 02/20/2020       The ASCVD Risk score (Aubree COLUNGA,  et al., 2019) failed to calculate for the following reasons:    Risk score cannot be calculated because patient has a medical history suggesting prior/existing ASCVD    Most Recent EKG Results  Results for orders placed or performed in visit on 01/10/22   IN OFFICE EKG 12-LEAD (to [place])    Collection Time: 01/10/22 10:55 AM    Narrative    Test Reason : I10,    Vent. Rate : 072 BPM     Atrial Rate : 072 BPM     P-R Int : 148 ms          QRS Dur : 122 ms      QT Int : 414 ms       P-R-T Axes : 042 -32 006 degrees     QTc Int : 453 ms    Normal sinus rhythm  Left axis deviation  Nonspecific ST abnormality  Abnormal ECG  When compared with ECG of 21-FEB-2020 15:05,  T wave inversion no longer evident in Anterior-lateral leads  Confirmed by KEVIN LEON MD ([id number]) on 1/11/2022 10:56:12 AM    Referred By: REJI WATTS           Confirmed By:KEVIN LEON MD       Most Recent Echocardiogram Results  Results for orders placed in visit on 01/20/22    Echo    Interpretation Summary  · The left ventricle is normal in size with eccentric hypertrophy and normal systolic function.  · The estimated ejection fraction is 60-65%.  · Normal left ventricular diastolic function.  · Normal right ventricular size with normal right ventricular systolic function.  · Normal central venous pressure (3 mmHg).  · The estimated PA systolic pressure is 24 mmHg.      Most Recent Nuclear Stress Test Results  No results found for this or any previous visit.      Most Recent Cardiac PET Stress Test Results  No results found for this or any previous visit.      Most Recent Cardiovascular Angiogram results  No results found for this or any previous visit.      Other Most Recent Cardiology Results  Results for orders placed during the hospital encounter of 02/15/24    CV Ultrasound Bilateral Doppler Carotid    Interpretation Summary    Unremarkable right carotid system with no significant restenosis in this patient with history of carotid  endarterectomy    There is 50-59% left Internal Carotid Stenosis.    Mild to moderate  heterogeneous plaque in the left carotid system.    Normal antegrade vertebral flow bilaterally.        All pertinent data including labs, imaging, EKGs, and studies listed above were reviewed.  Patient's most recent EKG tracing was personally interpreted by this provider.    Diagnoses:       1. Severe obesity (BMI 35.0-39.9) with comorbidity    2. Essential hypertension    3. History of right-sided carotid endarterectomy    4. Mixed hyperlipidemia    5. Other specified disorders of arteries and arterioles    6. Bruit         Plan for treatment of the above diagnoses:     Assessment & Plan    Increased amlodipine from 5mg to 10mg daily to address persistently elevated blood pressure  Initiated Digital Hypertension Program for improved BP monitoring and management  Ordered carotid ultrasound to evaluate for potential vascular issues  Ordered cholesterol panel    PLAN SUMMARY:  Cholesterol panel ordered  Increase amlodipine to 10mg daily  Refer to Digital Hypertension Program  Carotid ultrasound ordered  Continue current weight loss efforts    PLAN NOTE:  Explained the Digital Hypertension Program  Discussed the relationship between diet, exercise, and weight loss.  Continue current weight loss efforts through reduced food intake and increased physical activity.  Participate in Digital Hypertension Program.  Increased amlodipine from 5mg to 10mg daily.  Carotid ultrasound ordered to evaluate for potential vascular issues.  Cholesterol panel ordered   Referred to Digital Hypertension Program for improved BP monitoring and management.        Carotid Doppler   Lipid panel  Increase amlodipine to 10mg PO Daily   Continue aspirin 81 mg PO Daily   Continue atorvastatin 40 mg PO Daily   Continue Plavix 75 mg PO Daily   Continue hydrochlorothiazide 25 mg PO Daily   Continue lisinopril 40 mg PO Daily   Continue metoprolol succinate 50 mg PO  BID    Continue other cardiac medications  Mediterranean Diet/Cardiovascular Exercise Program    Visit today included increased complexity associated with the care of the episodic problem(s) addressed above in addition to managing the longitudinal care of the patient due to the serious and/or complex managed problem(s) listed above.    Parts of this note were transcribed using voice recognition and generative artificial intelligence software ([employer]).  Please excuse any grammatical or syntax errors and reach out to me with any questions or clarifications needed.    Patient queried and all questions were answered.    F/u in 1 year to reassess      Signed:    Wilberto Hook MD  2/3/2025 3:13 PM

## 2025-02-03 ENCOUNTER — OFFICE VISIT (OUTPATIENT)
Dept: CARDIOLOGY | Facility: CLINIC | Age: 80
End: 2025-02-03
Attending: INTERNAL MEDICINE
Payer: MEDICARE

## 2025-02-03 VITALS
WEIGHT: 270.94 LBS | DIASTOLIC BLOOD PRESSURE: 78 MMHG | SYSTOLIC BLOOD PRESSURE: 168 MMHG | BODY MASS INDEX: 37.93 KG/M2 | HEIGHT: 71 IN | HEART RATE: 60 BPM

## 2025-02-03 DIAGNOSIS — I77.89 OTHER SPECIFIED DISORDERS OF ARTERIES AND ARTERIOLES: ICD-10-CM

## 2025-02-03 DIAGNOSIS — R09.89 BRUIT: ICD-10-CM

## 2025-02-03 DIAGNOSIS — Z98.890 HISTORY OF RIGHT-SIDED CAROTID ENDARTERECTOMY: ICD-10-CM

## 2025-02-03 DIAGNOSIS — E66.01 SEVERE OBESITY (BMI 35.0-39.9) WITH COMORBIDITY: Primary | ICD-10-CM

## 2025-02-03 DIAGNOSIS — E78.2 MIXED HYPERLIPIDEMIA: ICD-10-CM

## 2025-02-03 DIAGNOSIS — I10 ESSENTIAL HYPERTENSION: ICD-10-CM

## 2025-02-03 PROCEDURE — 99999 PR PBB SHADOW E&M-EST. PATIENT-LVL III: CPT | Mod: PBBFAC,,, | Performed by: INTERNAL MEDICINE

## 2025-02-03 PROCEDURE — 1159F MED LIST DOCD IN RCRD: CPT | Mod: CPTII,S$GLB,, | Performed by: INTERNAL MEDICINE

## 2025-02-03 PROCEDURE — 3078F DIAST BP <80 MM HG: CPT | Mod: CPTII,S$GLB,, | Performed by: INTERNAL MEDICINE

## 2025-02-03 PROCEDURE — 3077F SYST BP >= 140 MM HG: CPT | Mod: CPTII,S$GLB,, | Performed by: INTERNAL MEDICINE

## 2025-02-03 PROCEDURE — 3288F FALL RISK ASSESSMENT DOCD: CPT | Mod: CPTII,S$GLB,, | Performed by: INTERNAL MEDICINE

## 2025-02-03 PROCEDURE — 1101F PT FALLS ASSESS-DOCD LE1/YR: CPT | Mod: CPTII,S$GLB,, | Performed by: INTERNAL MEDICINE

## 2025-02-03 PROCEDURE — G2211 COMPLEX E/M VISIT ADD ON: HCPCS | Mod: S$GLB,,, | Performed by: INTERNAL MEDICINE

## 2025-02-03 PROCEDURE — 99214 OFFICE O/P EST MOD 30 MIN: CPT | Mod: S$GLB,,, | Performed by: INTERNAL MEDICINE

## 2025-02-03 RX ORDER — AMLODIPINE BESYLATE 10 MG/1
10 TABLET ORAL NIGHTLY
Qty: 90 TABLET | Refills: 3 | Status: SHIPPED | OUTPATIENT
Start: 2025-02-03 | End: 2026-02-03

## 2025-02-05 ENCOUNTER — HOSPITAL ENCOUNTER (OUTPATIENT)
Dept: CARDIOLOGY | Facility: HOSPITAL | Age: 80
Discharge: HOME | End: 2025-02-05
Attending: INTERNAL MEDICINE
Payer: MEDICARE

## 2025-02-05 DIAGNOSIS — R09.89 BRUIT: ICD-10-CM

## 2025-02-05 PROCEDURE — 93880 EXTRACRANIAL BILAT STUDY: CPT | Mod: PO

## 2025-02-05 PROCEDURE — 93880 EXTRACRANIAL BILAT STUDY: CPT | Mod: 26,,, | Performed by: INTERNAL MEDICINE

## 2025-02-06 ENCOUNTER — TELEPHONE (OUTPATIENT)
Dept: CARDIOLOGY | Facility: CLINIC | Age: 80
End: 2025-02-06
Payer: MEDICARE

## 2025-02-06 LAB
LEFT ARM DIASTOLIC BLOOD PRESSURE: 78 MMHG
LEFT ARM SYSTOLIC BLOOD PRESSURE: 168 MMHG
LEFT CBA DIAS: 9 CM/S
LEFT CBA SYS: 61 CM/S
LEFT CCA DIST DIAS: 12 CM/S
LEFT CCA DIST SYS: 68 CM/S
LEFT CCA MID DIAS: 11 CM/S
LEFT CCA MID SYS: 80 CM/S
LEFT CCA PROX DIAS: 10 CM/S
LEFT CCA PROX SYS: 95 CM/S
LEFT ECA DIAS: 9 CM/S
LEFT ECA SYS: 125 CM/S
LEFT ICA DIST DIAS: 16 CM/S
LEFT ICA DIST SYS: 87 CM/S
LEFT ICA MID DIAS: 26 CM/S
LEFT ICA MID SYS: 171 CM/S
LEFT ICA PROX DIAS: 40 CM/S
LEFT ICA PROX SYS: 224 CM/S
LEFT VERTEBRAL DIAS: 9 CM/S
LEFT VERTEBRAL SYS: 46 CM/S
OHS CV CAROTID RIGHT ICA EDV HIGHEST: 17
OHS CV CAROTID ULTRASOUND LEFT ICA/CCA RATIO: 3.29
OHS CV CAROTID ULTRASOUND RIGHT ICA/CCA RATIO: 0.88
OHS CV PV CAROTID LEFT HIGHEST CCA: 95
OHS CV PV CAROTID LEFT HIGHEST ICA: 224
OHS CV PV CAROTID RIGHT HIGHEST CCA: 111
OHS CV PV CAROTID RIGHT HIGHEST ICA: 81
OHS CV US CAROTID LEFT HIGHEST EDV: 40
RIGHT ARM DIASTOLIC BLOOD PRESSURE: 78 MMHG
RIGHT ARM SYSTOLIC BLOOD PRESSURE: 168 MMHG
RIGHT CBA DIAS: 7 CM/S
RIGHT CBA SYS: 66 CM/S
RIGHT CCA DIST DIAS: 10 CM/S
RIGHT CCA DIST SYS: 92 CM/S
RIGHT CCA MID DIAS: 8 CM/S
RIGHT CCA MID SYS: 99 CM/S
RIGHT CCA PROX DIAS: 6 CM/S
RIGHT CCA PROX SYS: 111 CM/S
RIGHT ECA DIAS: 8 CM/S
RIGHT ECA SYS: 112 CM/S
RIGHT ICA DIST DIAS: 11 CM/S
RIGHT ICA DIST SYS: 54 CM/S
RIGHT ICA MID DIAS: 17 CM/S
RIGHT ICA MID SYS: 81 CM/S
RIGHT ICA PROX DIAS: 12 CM/S
RIGHT ICA PROX SYS: 73 CM/S
RIGHT VERTEBRAL DIAS: 8 CM/S
RIGHT VERTEBRAL SYS: 55 CM/S

## 2025-02-06 NOTE — TELEPHONE ENCOUNTER
----- Message from Wilberto Hook MD sent at 2/6/2025 10:31 AM CST -----  Left carotid artery with 50-69% stenosis.  Will reassess in 1 year.

## 2025-08-08 ENCOUNTER — TELEPHONE (OUTPATIENT)
Dept: CARDIOLOGY | Facility: CLINIC | Age: 80
End: 2025-08-08
Payer: MEDICARE

## 2025-08-08 NOTE — TELEPHONE ENCOUNTER
Copied from CRM #[id number]. Topic: Medications - Medication Question  >> Aug 7, 2025  2:33 PM Marko wrote:  Type:  RX Refill Request    Who Called: Brittney from DR. Potts Office    Refill or New Rx:    RX Name and Strength: lopidogreL (PLAVIX) 75 mg tablet 30 tablet 11 2/28/2020 2/3/2025   Sig - Route: Take 1 tablet (75 mg total) by mouth once daily. - Oral   Sent to pharmacy as: clopidogreL (PLAVIX) 75 mg tablet   E-Prescribing Status: Receipt confirmed by pharmacy (2/28/2020  3:02 PM CST)       How is the patient currently taking it? (ex. 1XDay):see above     Is this a 30 day or 90 day RX:see above     Ordering Provider:    Would the patient rather a call back or a response via [facility]? Call office    Best Call Back Number: [phone]    Additional Information: pt is due to have surgery on 8/19/2025 and needs advice in weather the pt is safe to be off of plavix 14 days pre & post opp.Nina advise